# Patient Record
(demographics unavailable — no encounter records)

---

## 2017-02-25 NOTE — PCM.PREANE
Preanesthetic Assessment





- ANESTHESIA/TRANSFUSION/FAMILY HX


Anesthesia/Transfusion History: No Prior Transfusion(s), Prior Anesthesia (no 

problems)


Family History of Anesthesia Reaction: No





- REVIEW OF SYSTEMS


Constitutional: Reports: feeling ill


CNS: Reports: numbness (at times in hands)


Respiratory: Reports: no symptoms


Cardiovascular: Reports: no symptoms


GI: Reports: nausea, vomiting


Other: Reports: none





- PHYSICAL ASSESSMENT


HR: 120


O2 Sat by Pulse Oximetry: 99


RR: 22


BP: 149/87


Temp: 36.8 C


Vital Signs: 





 Last Vital Signs











Temp  36.8 C   02/25/17 17:50


 


Pulse  120 H  02/25/17 17:50


 


Resp  22 H  02/25/17 17:50


 


BP  149/87 H  02/25/17 17:50


 


Pulse Ox  99   02/25/17 17:50











Height: 1.75 m


Weight: 68.039 kg


NPO Status Date: 02/25/17


NPO Status Time: 00:00


ASA Class: 1E


Mental Status: alert & oriented x3


Airway Class: Mallampati = 1


Dentition: Reports: normal dentition


Thyro-Mental Finger Breadths: 3


Mouth Opening Finger Breadths: 2


ROM/Head Extension: full


Respiratory Status: lungs clear to auscultation bilaterally


Cardiovascular Status: regular rate & rhythm, normal S1, S2, no murmur, blood 

pressure WNL





- LAB


Values: 





 Laboratory Last Values











WBC  10.50 K/mm3 (4.23-9.07)  H  02/25/17  12:10    


 


RBC  5.78 M/mm3 (4.63-6.08)   02/25/17  12:10    


 


Hgb  17.2 gm/L (13.7-17.5)   02/25/17  12:10    


 


Hct  48.4 % (40.1-51.0)   02/25/17  12:10    


 


MCV  83.7 fl (79.0-92.2)   02/25/17  12:10    


 


MCH  29.8 pg (25.7-32.2)   02/25/17  12:10    


 


MCHC  35.5 g/dl (32.2-35.5)   02/25/17  12:10    


 


RDW Std Deviation  37.4 fL (35.1-43.9)   02/25/17  12:10    


 


Plt Count  207 K/mm3 (163-337)   02/25/17  12:10    


 


MPV  9.9 fl (9.4-12.3)   02/25/17  12:10    


 


Neutrophils % (Manual)  78 % (40-60)  H  02/25/17  12:10    


 


Band Neutrophils %  0 % (0-10)   02/25/17  12:10    


 


Lymphocytes % (Manual)  13 % (20-40)  L  02/25/17  12:10    


 


Atypical Lymphs %  0 %  02/25/17  12:10    


 


Monocytes % (Manual)  9 % (2-10)   02/25/17  12:10    


 


Eosinophils % (Manual)  0 % (0.8-7.0)  L  02/25/17  12:10    


 


Basophils % (Manual)  0  (0.2-1.2)  L  02/25/17  12:10    


 


Platelet Estimate  Adequate   02/25/17  12:10    


 


Plt Morphology Comment  Normal   02/25/17  12:10    


 


RBC Morph Comment  Normal   02/25/17  12:10    


 


Sodium  141 mEq/L (136-145)   02/25/17  12:10    


 


Potassium  3.9 mEq/L (3.5-5.1)   02/25/17  12:10    


 


Chloride  103 mEq/L ()   02/25/17  12:10    


 


Carbon Dioxide  29 mEq/L (21-32)   02/25/17  12:10    


 


Anion Gap  12.9  (5-15)   02/25/17  12:10    


 


BUN  12 mg/dL (7-18)   02/25/17  12:10    


 


Creatinine  1.0 mg/dL (0.7-1.3)   02/25/17  12:10    


 


Est Cr Clr Drug Dosing  TNP   02/25/17  12:10    


 


Estimated GFR (MDRD)  > 60 mL/min (>60)   02/25/17  12:10    


 


BUN/Creatinine Ratio  12.0  (14-18)  L  02/25/17  12:10    


 


Glucose  93 mg/dL ()   02/25/17  12:10    


 


Calcium  9.5 mg/dL (8.5-10.1)   02/25/17  12:10    


 


Total Bilirubin  1.8 mg/dL (0.2-1.0)  H  02/25/17  12:10    


 


AST  15 U/L (15-37)   02/25/17  12:10    


 


ALT  27 U/L (16-63)   02/25/17  12:10    


 


Alkaline Phosphatase  74 U/L ()   02/25/17  12:10    


 


Total Protein  7.6 g/dl (6.4-8.2)   02/25/17  12:10    


 


Albumin  4.4 g/dl (3.4-5.0)   02/25/17  12:10    


 


Globulin  3.2 gm/dL  02/25/17  12:10    


 


Albumin/Globulin Ratio  1.4  (1-2)   02/25/17  12:10    


 


Urine Color  Yellow  (Yellow)   02/25/17  13:55    


 


Urine Appearance  Clear  (Clear)   02/25/17  13:55    


 


Urine pH  6.0  (5.0-8.0)   02/25/17  13:55    


 


Ur Specific Gravity  1.025  (1.005-1.030)   02/25/17  13:55    


 


Urine Protein  Trace  (Negative)  H  02/25/17  13:55    


 


Urine Glucose (UA)  Negative  (Negative)   02/25/17  13:55    


 


Urine Ketones  1+  (Negative)  H  02/25/17  13:55    


 


Urine Occult Blood  Trace-intact  (Negative)  H  02/25/17  13:55    


 


Urine Nitrite  Negative  (Negative)   02/25/17  13:55    


 


Urine Bilirubin  Negative  (Negative)   02/25/17  13:55    


 


Urine Urobilinogen  0.2  (0.2-1.0)   02/25/17  13:55    


 


Ur Leukocyte Esterase  Negative  (Negative)   02/25/17  13:55    


 


Urine RBC  0-5 /hpf (0-5)   02/25/17  13:55    


 


Urine WBC  0-5 /hpf (0-5)   02/25/17  13:55    


 


Ur Epithelial Cells  0-5 /hpf (0-5)   02/25/17  13:55    


 


Urine Bacteria  Occasional /hpf (FEW)   02/25/17  13:55    


 


Urine Mucus  Moderate /hpf (FEW)  H  02/25/17  13:55    














- ALLERGIES


Allergies/Adverse Reactions: 


 Allergies











Allergy/AdvReac Type Severity Reaction Status Date / Time


 


No Known Allergies Allergy   Verified 08/02/16 18:50














- BLOOD


Blood Available: No


Product(s) Available: None





- ANESTHESIA PLAN


Preop Beta Blocker: No


Anesthesia Type Planned: general anesthesia





- ACKNOWLEDGEMENTS


Pt an appropriate candidate for the planned anesthesia: Yes


Alternatives and risks of anesthesia discussed w pt/guardian: Yes


Pt/Guardian understands and agree with anesthesia plan: Yes





PreAnesthesia Questionnaire





- Past Health History


Medical/Surgical History: Denies Medical/Surgical History





- SUBSTANCE USE


Smoking Status *Q: Never Smoker


Tobacco Use Within Last Twelve Months: No


Second Hand Smoke Exposure: No


Days Per Week of Alcohol Use: 0


Number of Drinks Per Day: 0


Total Drinks Per Week: 0


Recreational Drug Use History: No





- HOME MEDS


Home Medications: 


 Home Meds





. [No Known Home Meds]  08/02/16 [History]











- CURRENT (IN HOUSE) MEDS


Current Meds: 





 Current Medications





Fentanyl (Sublimaze)  50 mcg IVPUSH Q5M PRN


   PRN Reason: Pain


   Stop: 02/25/17 18:04


Hydromorphone HCl (Dilaudid)  0.5 mg IVPUSH Q15M PRN


   PRN Reason: Pain


   Stop: 02/25/17 18:04


Lactated Ringer's (Ringers, Lactated)  1,000 mls @ 150 mls/hr IV ASDIRECTED BRITTANY


   Last Admin: 02/25/17 12:31 Dose:  150 mls/hr


Scopolamine (Transderm-Scop)  1.5 mg TRDERM Q72H PRN


   PRN Reason: Nausea


Sodium Chloride (Saline Flush)  10 ml FLUSH ONETIME PRN


   PRN Reason: IV FLUSH


   Last Admin: 02/25/17 14:21 Dose:  10 ml





Discontinued Medications





Bupivacaine HCl/Epinephrine Bitart (Marcaine 0.5%/Epinephrine 1:200,000) 

Confirm Administered Dose 50 ml .ROUTE .STK-MED ONE


   Stop: 02/25/17 16:12


   Last Admin: 02/25/17 17:00 Dose:  50 ml


Dexamethasone (Dexamethasone) Confirm Administered Dose 20 mg .ROUTE .STK-MED 

ONE


   Stop: 02/25/17 16:53


Diatrizoate Meglum/Diatrizoate Sod (Gastrografin 37%)  90 ml PO ONETIME ONE


   Stop: 02/25/17 14:02


   Last Admin: 02/25/17 14:19 Dose:  90 ml


Diphenhydramine HCl (Benadryl) Confirm Administered Dose 50 mg .ROUTE .STK-MED 

ONE


   Stop: 02/25/17 16:53


Fentanyl (Sublimaze) Confirm Administered Dose 250 mcg .ROUTE .STK-MED ONE


   Stop: 02/25/17 16:17


Glycopyrrolate () Confirm Administered Dose 1 mg .ROUTE .STK-MED ONE


   Stop: 02/25/17 17:36


Hydromorphone HCl (Dilaudid)  0.5 mg IVPUSH ONETIME ONE


   Stop: 02/25/17 12:41


   Last Admin: 02/25/17 12:54 Dose:  0.5 mg


Hydromorphone HCl (Dilaudid)  0.5 mg IVPUSH ONETIME ONE


   Stop: 02/25/17 15:15


   Last Admin: 02/25/17 15:21 Dose:  0.5 mg


Ertapenem 1 gm/ Sodium (Chloride)  100 mls @ 100 mls/hr IV ONETIME ONE


   Stop: 02/25/17 16:42


   Last Admin: 02/25/17 15:52 Dose:  100 mls/hr


Lactated Ringer's (Ringers, Lactated) Confirm Administered Dose 1,000 mls @ as 

directed .ROUTE .STK-MED ONE


   Stop: 02/25/17 17:08


Iopamidol (Isovue-300 (61%))  125 ml IVPUSH ONETIME ONE


   Stop: 02/25/17 14:02


   Last Admin: 02/25/17 14:21 Dose:  125 ml


Lidocaine HCl (Lidocaine 1%) Confirm Administered Dose 4 ml .ROUTE .STK-MED ONE


   Stop: 02/25/17 16:17


Lidocaine/Epinephrine (Xylocaine 1% With Epinephrine 1:100,000) Confirm 

Administered Dose 20 ml .ROUTE .STK-MED ONE


   Stop: 02/25/17 16:12


   Last Admin: 02/25/17 17:01 Dose:  20 ml


Metoclopramide HCl (Reglan)  5 mg IVPUSH ONETIME ONE


   Stop: 02/25/17 15:15


   Last Admin: 02/25/17 15:20 Dose:  5 mg


Metoclopramide HCl (Reglan)  5 mg IVPUSH ONETIME ONE


   Stop: 02/25/17 16:07


   Last Admin: 02/25/17 16:11 Dose:  5 mg


Midazolam HCl (Versed 1 Mg/Ml) Confirm Administered Dose 2 mg .ROUTE .STK-MED 

ONE


   Stop: 02/25/17 16:17


Neostigmine Methylsulfate (Neostigmine) Confirm Administered Dose 5 mg .ROUTE 

.STK-MED ONE


   Stop: 02/25/17 17:36


Ondansetron HCl (Zofran)  4 mg IVPUSH ONETIME ONE


   Stop: 02/25/17 12:21


   Last Admin: 02/25/17 12:31 Dose:  4 mg


Ondansetron HCl (Zofran)  4 mg IVPUSH ONETIME ONE


   Stop: 02/25/17 12:41


   Last Admin: 02/25/17 12:53 Dose:  4 mg


Ondansetron HCl (Zofran) Confirm Administered Dose 4 mg .ROUTE .STK-MED ONE


   Stop: 02/25/17 16:16


Propofol (Diprivan  20 Ml) Confirm Administered Dose 200 mg .ROUTE .STK-MED ONE


   Stop: 02/25/17 16:17


Rocuronium Bromide (Zemuron) Confirm Administered Dose 50 mg .ROUTE .STK-MED ONE


   Stop: 02/25/17 16:16

## 2017-02-25 NOTE — EDM.PDOC
ED HPI GI/ABDOMINAL





- General


Chief Complaint: Abdominal Pain


Stated Complaint: LOWER ABD PAIN


Time Seen by Provider: 02/25/17 11:59





- History of Present Illness


INITIAL COMMENTS - FREE TEXT/NARRATIVE: 





23-year-old male presents emergency room with abdominal pain.





Patient complains of midabdominal discomfortthis started very early this 

morning over time this pain migrated over to the right lower quadrant and 

became a little bit more intense. The patient had an episode of lower abdominal 

discomfort this last summer and he responded favorably to constipation 

treatment with magnesium citrate. This is different his pain before was on the 

left this is now on the right. His father drove him in this morning and every 

bump in the Road aggravated his abdominal pain. Patient's past medical history 

is otherwise unremarkable.





- Related Data


Allergies/ADRs: 


 Allergies











Allergy/AdvReac Type Severity Reaction Status Date / Time


 


No Known Allergies Allergy   Verified 08/02/16 18:50











Home Meds: 


 Home Meds





. [No Known Home Meds]  08/02/16 [History]











Past Medical History





- Past Health History


Medical/Surgical History: Denies Medical/Surgical History





Social & Family History





- Tobacco Use


Smoking Status *Q: Never Smoker


Second Hand Smoke Exposure: No





- Recreational Drug Use


Recreational Drug Use: No





- Living Situation & Occupation


Occupation: employed





ED ROS GENERAL





- Review of Systems


Review Of Systems: See Below


Constitutional: Reports: no symptoms.  Denies: fever, chills


HEENT: Reports: No symptoms


Respiratory: Reports: no symptoms


Cardiovascular: Reports: No symptoms


GI/Abdominal: Reports: Abdominal pain, Nausea, Vomiting.  Denies: Constipation, 

Diarrhea


: Reports: no symptoms


Musculoskeletal: Reports: no symptoms





ED EXAM, GI/ABD





- Physical Exam


Exam: See Below


Exam Limited By: No limitations


General Appearance: alert, no apparent distress


Head: atraumatic, normocephalic


Respiratory/Chest: no respiratory distress, lungs clear, normal breath sounds


Cardiovascular: regular rate, rhythm, no edema, no murmur


GI/Abdominal: normal bowel sounds, soft.  No: non tender


 (Male) Exam: Other (and Wednesday but none he has significant right lower 

quadrant discomfort with mild rebound tenderness, is indeed present and 

reproducible. No rigidity.)


Back Exam: normal inspection.  No: CVA tenderness (L), CVA tenderness (R)





Course





- Vital Signs


Last Recorded V/S: 


 Last Vital Signs











Temp  36.9 C   02/25/17 11:53


 


Pulse  67   02/25/17 11:53


 


Resp  20   02/25/17 11:53


 


BP  128/89   02/25/17 11:53


 


Pulse Ox  99   02/25/17 11:53














- Orders/Labs/Meds


Orders: 


 Active Orders 24 hr











 Category Date Time Status


 


 Abdomen 2V AP Flat Upright [CR] Stat Exams  02/25/17 12:19 Taken


 


 Ertapenem [INVanz] 1 gm Med  02/25/17 15:43 Active





 Sodium Chloride 0.9% [Normal Saline] 100 ml   





 IV ONETIME   


 


 Lactated Ringers [Ringers, Lactated] 1,000 ml Med  02/25/17 12:30 Active





 IV ASDIRECTED   


 


 Sodium Chloride 0.9% [Saline Flush] Med  02/25/17 14:01 Active





 10 ml FLUSH ONETIME PRN   








 Medication Orders





Lactated Ringer's (Ringers, Lactated)  1,000 mls @ 150 mls/hr IV ASDIRECTED BRITTANY


   Last Admin: 02/25/17 12:31  Dose: 150 mls/hr


Ertapenem 1 gm/ Sodium (Chloride)  100 mls @ 100 mls/hr IV ONETIME ONE


   Stop: 02/25/17 16:42


   Last Admin: 02/25/17 15:52  Dose: 100 mls/hr


Sodium Chloride (Saline Flush)  10 ml FLUSH ONETIME PRN


   PRN Reason: IV FLUSH


   Last Admin: 02/25/17 14:21  Dose: 10 ml








Labs: 


 Laboratory Tests











  02/25/17 02/25/17 02/25/17 Range/Units





  12:10 12:10 13:55 


 


WBC  10.50 H    (4.23-9.07)  K/mm3


 


RBC  5.78    (4.63-6.08)  M/mm3


 


Hgb  17.2    (13.7-17.5)  gm/L


 


Hct  48.4    (40.1-51.0)  %


 


MCV  83.7    (79.0-92.2)  fl


 


MCH  29.8    (25.7-32.2)  pg


 


MCHC  35.5    (32.2-35.5)  g/dl


 


RDW Std Deviation  37.4    (35.1-43.9)  fL


 


Plt Count  207    (163-337)  K/mm3


 


MPV  9.9    (9.4-12.3)  fl


 


Neutrophils % (Manual)  78 H    (40-60)  %


 


Band Neutrophils %  0    (0-10)  %


 


Lymphocytes % (Manual)  13 L    (20-40)  %


 


Atypical Lymphs %  0    %


 


Monocytes % (Manual)  9    (2-10)  %


 


Eosinophils % (Manual)  0 L    (0.8-7.0)  %


 


Basophils % (Manual)  0 L    (0.2-1.2)  


 


Platelet Estimate  Adequate    


 


Plt Morphology Comment  Normal    


 


RBC Morph Comment  Normal    


 


Sodium   141   (136-145)  mEq/L


 


Potassium   3.9   (3.5-5.1)  mEq/L


 


Chloride   103   ()  mEq/L


 


Carbon Dioxide   29   (21-32)  mEq/L


 


Anion Gap   12.9   (5-15)  


 


BUN   12   (7-18)  mg/dL


 


Creatinine   1.0   (0.7-1.3)  mg/dL


 


Est Cr Clr Drug Dosing   TNP   


 


Estimated GFR (MDRD)   > 60   (>60)  mL/min


 


BUN/Creatinine Ratio   12.0 L   (14-18)  


 


Glucose   93   ()  mg/dL


 


Calcium   9.5   (8.5-10.1)  mg/dL


 


Total Bilirubin   1.8 H   (0.2-1.0)  mg/dL


 


AST   15   (15-37)  U/L


 


ALT   27   (16-63)  U/L


 


Alkaline Phosphatase   74   ()  U/L


 


Total Protein   7.6   (6.4-8.2)  g/dl


 


Albumin   4.4   (3.4-5.0)  g/dl


 


Globulin   3.2   gm/dL


 


Albumin/Globulin Ratio   1.4   (1-2)  


 


Urine Color    Yellow  (Yellow)  


 


Urine Appearance    Clear  (Clear)  


 


Urine pH    6.0  (5.0-8.0)  


 


Ur Specific Gravity    1.025  (1.005-1.030)  


 


Urine Protein    Trace H  (Negative)  


 


Urine Glucose (UA)    Negative  (Negative)  


 


Urine Ketones    1+ H  (Negative)  


 


Urine Occult Blood    Trace-intact H  (Negative)  


 


Urine Nitrite    Negative  (Negative)  


 


Urine Bilirubin    Negative  (Negative)  


 


Urine Urobilinogen    0.2  (0.2-1.0)  


 


Ur Leukocyte Esterase    Negative  (Negative)  


 


Urine RBC    0-5  (0-5)  /hpf


 


Urine WBC    0-5  (0-5)  /hpf


 


Ur Epithelial Cells    0-5  (0-5)  /hpf


 


Urine Bacteria    Occasional  (FEW)  /hpf


 


Urine Mucus    Moderate H  (FEW)  /hpf











Meds: 


Medications











Generic Name Dose Route Start Last Admin





  Trade Name Freq  PRN Reason Stop Dose Admin


 


Lactated Ringer's  1,000 mls @ 150 mls/hr  02/25/17 12:30  02/25/17 12:31





  Ringers, Lactated  IV   150 mls/hr





  ASDIRECTED BRITTANY   Administration


 


Ertapenem 1 gm/ Sodium  100 mls @ 100 mls/hr  02/25/17 15:43  02/25/17 15:52





  Chloride  IV  02/25/17 16:42  100 mls/hr





  ONETIME ONE   Administration


 


Sodium Chloride  10 ml  02/25/17 14:01  02/25/17 14:21





  Saline Flush  FLUSH   10 ml





  ONETIME PRN   Administration





  IV FLUSH   














Discontinued Medications














Generic Name Dose Route Start Last Admin





  Trade Name Freq  PRN Reason Stop Dose Admin


 


Diatrizoate Meglum/Diatrizoate Sod  90 ml  02/25/17 14:01  02/25/17 14:19





  Gastrografin 37%  PO  02/25/17 14:02  90 ml





  ONETIME ONE   Administration


 


Hydromorphone HCl  0.5 mg  02/25/17 12:40  02/25/17 12:54





  Dilaudid  IVPUSH  02/25/17 12:41  0.5 mg





  ONETIME ONE   Administration


 


Hydromorphone HCl  0.5 mg  02/25/17 15:14  02/25/17 15:21





  Dilaudid  IVPUSH  02/25/17 15:15  0.5 mg





  ONETIME ONE   Administration


 


Iopamidol  125 ml  02/25/17 14:01  02/25/17 14:21





  Isovue-300 (61%)  IVPUSH  02/25/17 14:02  125 ml





  ONETIME ONE   Administration


 


Metoclopramide HCl  5 mg  02/25/17 15:14  02/25/17 15:20





  Reglan  IVPUSH  02/25/17 15:15  5 mg





  ONETIME ONE   Administration


 


Ondansetron HCl  4 mg  02/25/17 12:20  02/25/17 12:31





  Zofran  IVPUSH  02/25/17 12:21  4 mg





  ONETIME ONE   Administration


 


Ondansetron HCl  4 mg  02/25/17 12:40  02/25/17 12:53





  Zofran  IVPUSH  02/25/17 12:41  4 mg





  ONETIME ONE   Administration














- Re-Assessments/Exams


Free Text/Narrative Re-Assessment/Exam: 





02/25/17 15:08


labs nonspecific. Follow through with a abdominal pelvic CT which could be 

consistent with early appendicitis but is not clearly definitive. Case 

discussed with Dr. Wheeler who will evaluate the patient.





Departure





- Departure


Time of Disposition: 15:09


Disposition: DC/Tfer to [facility]


Clinical Impression: 


 Appendicitis








- My Orders


Last 24 Hours: 


My Active Orders





02/25/17 12:19


Abdomen 2V AP Flat Upright [CR] Stat 





02/25/17 12:30


Lactated Ringers [Ringers, Lactated] 1,000 ml IV ASDIRECTED 





02/25/17 14:01


Sodium Chloride 0.9% [Saline Flush]   10 ml FLUSH ONETIME PRN 














- Assessment/Plan


Last 24 Hours: 


My Active Orders





02/25/17 12:19


Abdomen 2V AP Flat Upright [CR] Stat 





02/25/17 12:30


Lactated Ringers [Ringers, Lactated] 1,000 ml IV ASDIRECTED 





02/25/17 14:01


Sodium Chloride 0.9% [Saline Flush]   10 ml FLUSH ONETIME PRN

## 2017-02-25 NOTE — CT
CT abdomen and pelvis

 

Technique: Multiple axial sections were obtained from above the dome 

of the diaphragm inferiorly through the pubic symphysis.  Delayed 

images were also obtained through the bladder.

 

Comparison: No previous abdominal CT, previous abdominal x-ray is 

available performed on the same day.

 

Findings: Visualized lung bases shows nothing acute.  Liver shows no 

focal parenchymal abnormality.  Spleen appears within normal limits.  

Adrenal glands show no nodule.  Pancreas is with within normal limits.

  Gallbladder shows no calcified gallstones.  Kidneys show symmetric 

contrast enhancement.  Small low-density finding is seen within the 

mid to lower left kidney measuring about 4 mm which is too small to 

characterize by Hounsfield unit measurements but most likely 

represents a minimal cyst.

 

Aorta shows no aneurysmal dilatation.  No retroperitoneal adenopathy 

is seen.  No mesenteric abnormalities are appreciated.  No pelvic mass

 or adenopathy is seen.  Delayed images shows contrast within the 

distal ureters and within the bladder.

 

Short tubular structure containing fluid is seen within the right 

lower quadrant.  Finding could represent a short but slightly enlarged

 appendix.  Difficult to exclude early appendicitis, please correlate 

with patient's symptoms as this is not a definite finding.

 

Bone window settings were reviewed which shows unilateral 

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

 

Impression:

1.  Short tubular structure within the right lower abdomen containing 

fluid.  Findings could represent a small but slightly dilated 

appendix.  Difficult to exclude early appendicitis although this is 

not a definite finding.  Please correlate with the patient's symptoms.

2.  Other incidental findings as noted above

 

Diagnostic code #3

## 2017-02-25 NOTE — PCM.OPNOTE
- General Post-Op/Procedure Note


Date of Surgery/Procedure: 02/25/17


Operative Procedure(s): Laparoscopic appendectomy


Pre Op Diagnosis: Abdominal pain consistent with acute appendicitis


Post-Op Diagnosis: Retrocecal appendix with non-perforated appendicitis


Anesthesia Technique: General ET tube, Local


Primary Surgeon: Zoraida Wheeler


Anesthesia Provider: Alexis Hadley


Pathology: 





Appendix


Fluid Replacement, Intraop: 1,000 (mL crystalloid )


EBL in mLs: 3


Complications: None


Condition: Good


Free Text/Narrative:: 





INDICATION FOR PROCEDURE:


The patient is a 23-year-old man who had been referred to me by Dr. Wilder Deleon from the Emergency Department for evaluation for acute appendicitis.  

I discussed with the patient performing a laparoscopic appendectomy and 

associated risks of the procedure.  The patient found these risks acceptable 

and agreed to proceed. 





DESCRIPTION OF PROCEDURE:


The patient was taken to the operating room and placed in the supine position.  

Sequential compressive devices were placed on the bilateral lower extremities.  

After induction of general endotracheal anesthesia, the abdomen was prepped and 

draped in the usual sterile fashion. The left arm had been tucked at the patient

's side and pressure points adequately padded. Treatment antibiotics in the 

form of Invanz had been administered as per protocol. 





A curvilinear supraumbilical incision was made using a scalpel.  The abdomen 

was bluntly entered using a hemostat.  An 0 Vicryl stay suture was placed.  A 

Mirza cannula was introduced into the abdomen and the abdomen was insufflated 

to 15 mm of mercury.  The abdomen was then surveyed.  There was a small amount 

of turbid fluid. The liver, stomach, gallbladder, and other visualized portions 

of the intra-abdominal organs appeared unremarkable. 





Two additional 5 mm trocars were then placed under direct visualization after 

first injecting local anesthetic, one in the suprapubic region and one in the 

left lower quadrant. The appendix was densely adherent to the terminal ileum 

and was retrocecal. After appropriate moblization, the appendix was then 

dissected at the base of the cecum. A mesenteric window was created using a 

[place]. The appendiceal base was then taken at the cecum using a white load 

Endo JAIRO stapler x 2.  The appendiceal mesentery was very carefully taken using 

the Harmonic scalpel, dissecting the appendix slowly away from the terminal 

ileum. The appendix was placed into a Endo Catch bag. The abdomen was suctioned 

and irrigated. There was some slight ooze from the staple line, two 5 mm clips 

were applied and piece of Surgicel was placed. Hemostasis was confirmed. The 5 

mm trocars were removed with no evidence of bleeding. The Mirza cannula and 

appendix within the EndoCatch bag were then removed.  





The patient's fascial incision was closed using an 0 Vicryl figure-of-eight 

suture. Additional local anesthetic was injected astrid-incisionally.  The 

incisions were then closed using subcuticular 4-0 Monocryl suture.  Dermabond 

was placed over the patient's skin incisions.  





The patient was then awakened from anesthesia, extubated, and transferred to 

the recovery room in stable condition having tolerated the procedure well. 

Sponge and instrument counts were reported as correct at the end of the case.





POSTOPERATIVE PLAN: 


I discussed my intraoperative findings and post-operative care instructions 

with the patient's parents. The patient will be discharged home today. 

Prescriptions for Percocet 5/325mg and Zofran ODT were given via [facility]. A 

Scopolamine patch was also applied and is to be removed in 3 days. The patient 

will follow up with me in 2 weeks for a post-operative check. They are not lift 

over 20 pounds for the next 4 weeks. They are to call the office with any 

questions or concerns.

## 2017-02-25 NOTE — PCM.CONS
H&P History of Present Illness





- General


Date of Service: 02/25/17


Admit Problem/Dx: 


 Admission Diagnosis/Problem





Admission Diagnosis/Problem      Appendicitis











- History of Present Illness


Initial Comments - Free Text/Narative: 





The patient is a 23-year-old man who presents with complaints of abdominal pain 

for the past approximately 18 hours. They were referred to me by Dr. Wilder Deleon in the emergency department. The pain began at 6 PM yesterday, around 

the umbilicus and is progressively moved to the right lower quadrant. They have 

never had pain like this before, except for one time last year when he had left 

lower quadrant pain when he was constipated.. They have had nausea, vomiting, 

fever and chills, including emesis x2 in the emergency department.  Denies 

hematochezia, melena, diarrhea or constipation. Last bowel movement was 

yesterday and the patient did not look at it. They deny any personal or family 

history of inflammatory bowel disease.  No history of colon cancer in first-

degree relatives.  No previous abdominal operations. White blood cell count was 

fairly unremarkable at 10,000. CT scan of the Abdomen and Pelvis was performed 

demonstrating equivocal appendicitis per the radiologist.  I did not appreciate 

any obvious signs of appendicitis on the CT scan. No prior colonoscopy.  Here 

today with mom and dad. 


  ** Middle Abdomen


Pain Score (Numeric/FACES): 5





- Related Data


Allergies/Adverse Reactions: 


 Allergies











Allergy/AdvReac Type Severity Reaction Status Date / Time


 


No Known Allergies Allergy   Verified 08/02/16 18:50











Home Medications: 


 Home Meds





. [No Known Home Meds]  08/02/16 [History]











Past Medical History





- Past Health History


Medical/Surgical History: Denies Medical/Surgical History





Social & Family History





- Family History


Family Medical History: Noncontributory


Other Family History: 








No family hx of inflammatory bowel disease or colon cancer in first-degree 

relatives. No family hx of bleeding/clotting disorders.  Mom has a history of 

severe postoperative nausea and vomiting. 








- Tobacco Use


Smoking Status *Q: Never Smoker


Second Hand Smoke Exposure: No





- Caffeine Use


Caffeine Use: Reports: Coffee, Soda





- Alcohol Use


Alcohol Use History: No





- Recreational Drug Use


Recreational Drug Use: No





- Living Situation & Occupation


Living situation: Reports: single, alone


Occupation: employed


Social History Comment: Patient works at [employer].





H&P Review of Systems





- Review of Systems:


Review Of Systems: ROS reveals no pertinent complaints other than HPI.


General: Reports: fever, chills


HEENT: Reports: no symptoms


Pulmonary: Reports: no symptoms


Cardiovascular: Reports: no symptoms.  Denies: chest pain, palpitations, 

dyspnea on exertion


Gastrointestinal: Reports: Abdominal pain, Nausea, Vomiting.  Denies: Bloody 

stool, Constipation, Diarrhea


Musculoskeletal: Reports: no symptoms


Skin: Reports: no symptoms


Psychiatric: Reports: no symptoms


Neurological: Reports: no symptoms


Hematologic/Lymphatic: Reports: no symptoms


Immunologic: Reports: no symptoms





Exam





- Exam


Exam: See Below





- Vital Signs


Vital Signs: 


 Last Vital Signs











Temp  98.4 F   02/25/17 11:53


 


Pulse  67   02/25/17 11:53


 


Resp  20   02/25/17 11:53


 


BP  128/89   02/25/17 11:53


 


Pulse Ox  99   02/25/17 11:53











Weight: 150 lb





- Exam


General: alert, oriented, cooperative, mild distress


HEENT: Conjunctiva clear, Hearing intact.  No: Scleral icterus


Neck: supple, trachea midline, 2


Lungs: Clear to auscultation, Normal respiratory effort


Cardiovascular: regular rate, regular rhythm


Abdomen: soft, tenderness (RLQ).  No: peritoneal signs, distention, guarding, 

rigidity, rebound, mass


Rectal (Males) Exam: Deferred


Extremities: normal inspection.  No: clubbing, cyanosis, calf tenderness, edema


Skin: warm, dry, intact


Neurological: normal speech.  No: focal deficit


Neuro Extensive - Mental Status: alert, oriented x3, normal mood/affect, normal 

cognition


Psychiatric: alert, normal affect, normal mood





- Patient Data


Lab Results last 24 hrs: 


 Laboratory Results - last 24 hr











  02/25/17 02/25/17 02/25/17 Range/Units





  12:10 12:10 13:55 


 


WBC  10.50 H    (4.23-9.07)  K/mm3


 


RBC  5.78    (4.63-6.08)  M/mm3


 


Hgb  17.2    (13.7-17.5)  gm/L


 


Hct  48.4    (40.1-51.0)  %


 


MCV  83.7    (79.0-92.2)  fl


 


MCH  29.8    (25.7-32.2)  pg


 


MCHC  35.5    (32.2-35.5)  g/dl


 


RDW Std Deviation  37.4    (35.1-43.9)  fL


 


Plt Count  207    (163-337)  K/mm3


 


MPV  9.9    (9.4-12.3)  fl


 


Neutrophils % (Manual)  78 H    (40-60)  %


 


Band Neutrophils %  0    (0-10)  %


 


Lymphocytes % (Manual)  13 L    (20-40)  %


 


Atypical Lymphs %  0    %


 


Monocytes % (Manual)  9    (2-10)  %


 


Eosinophils % (Manual)  0 L    (0.8-7.0)  %


 


Basophils % (Manual)  0 L    (0.2-1.2)  


 


Platelet Estimate  Adequate    


 


Plt Morphology Comment  Normal    


 


RBC Morph Comment  Normal    


 


Sodium   141   (136-145)  mEq/L


 


Potassium   3.9   (3.5-5.1)  mEq/L


 


Chloride   103   ()  mEq/L


 


Carbon Dioxide   29   (21-32)  mEq/L


 


Anion Gap   12.9   (5-15)  


 


BUN   12   (7-18)  mg/dL


 


Creatinine   1.0   (0.7-1.3)  mg/dL


 


Est Cr Clr Drug Dosing   TNP   


 


Estimated GFR (MDRD)   > 60   (>60)  mL/min


 


BUN/Creatinine Ratio   12.0 L   (14-18)  


 


Glucose   93   ()  mg/dL


 


Calcium   9.5   (8.5-10.1)  mg/dL


 


Total Bilirubin   1.8 H   (0.2-1.0)  mg/dL


 


AST   15   (15-37)  U/L


 


ALT   27   (16-63)  U/L


 


Alkaline Phosphatase   74   ()  U/L


 


Total Protein   7.6   (6.4-8.2)  g/dl


 


Albumin   4.4   (3.4-5.0)  g/dl


 


Globulin   3.2   gm/dL


 


Albumin/Globulin Ratio   1.4   (1-2)  


 


Urine Color    Yellow  (Yellow)  


 


Urine Appearance    Clear  (Clear)  


 


Urine pH    6.0  (5.0-8.0)  


 


Ur Specific Gravity    1.025  (1.005-1.030)  


 


Urine Protein    Trace H  (Negative)  


 


Urine Glucose (UA)    Negative  (Negative)  


 


Urine Ketones    1+ H  (Negative)  


 


Urine Occult Blood    Trace-intact H  (Negative)  


 


Urine Nitrite    Negative  (Negative)  


 


Urine Bilirubin    Negative  (Negative)  


 


Urine Urobilinogen    0.2  (0.2-1.0)  


 


Ur Leukocyte Esterase    Negative  (Negative)  


 


Urine RBC    0-5  (0-5)  /hpf


 


Urine WBC    0-5  (0-5)  /hpf


 


Ur Epithelial Cells    0-5  (0-5)  /hpf


 


Urine Bacteria    Occasional  (FEW)  /hpf


 


Urine Mucus    Moderate H  (FEW)  /hpf











Result Diagrams: 


 02/25/17 12:10





 02/25/17 12:10


Imaging Impressions last 24 hrs: 





CT images personally reviewed, please see HPI. 





Consult PN Assessment/Plan


Procedures: 


Procedures





C-REACTIVE PROTEIN (08/02/16)


COMPLETE CBC W/AUTO DIFF WBC (08/02/16)


COMPREHEN METABOLIC PANEL (08/02/16)


EMERGENCY DEPT VISIT (08/02/16)


HYDRATE IV INFUSION ADD-ON (08/02/16)


ROUTINE VENIPUNCTURE (08/02/16)


THER/PROPH/DIAG INJ IV PUSH (08/02/16)


TX/PRO/DX INJ NEW DRUG ADDON (08/02/16)


URINALYSIS AUTO W/SCOPE (08/02/16)


X-RAY EXAM OF ABDOMEN (08/02/16)








(1) Nausea & vomiting


SNOMED Code(s): 05149190


   Code(s): R11.2 - NAUSEA WITH VOMITING, UNSPECIFIED   Current Visit: Yes   





(2) Appendicitis


SNOMED Code(s): 15046576


   Code(s): K37 - UNSPECIFIED APPENDICITIS   Current Visit: Yes   





(3) Abdominal pain


SNOMED Code(s): 21672812


   Code(s): R10.9 - UNSPECIFIED ABDOMINAL PAIN   Current Visit: No   


Qualifiers: 


   Abdominal location: right lower quadrant   Qualified Code(s): R10.31 - Right 

lower quadrant pain   


Problem List Initiated/Reviewed/Updated: Yes


My Orders last 24 hours: 


My Active Orders





02/25/17 15:43


Ertapenem [INVanz] 1 gm   Sodium Chloride 0.9% [Normal Saline] 100 ml IV 

ONETIME 











Plan: 








23-year-old with acute appendicitis 





I discussed with the patient and his parents that his CT scan was quite 

unremarkable.  We discussed the has a small , nonmobile kidney stone on the 

left.  We also discussed that his appendix did not appear significantly 

diseased at this point, however his story is classic for appendicitis.  He 

would like to proceed with appendectomy, which I do not think is unreasonable.  

We discussed proceeding with laparoscopic, possible open, appendectomy. Risks 

of the procedure were discussed including pain, bleeding, infection, scarring, 

need for additional procedures, risks of anesthesia. The patient found these 

risks acceptable and agreed to proceed. Invanz 1 gm was started in the 

Emergency Department.

## 2017-02-25 NOTE — PCM.POSTAN
POST ANESTHESIA ASSESSMENT





- MENTAL STATUS


Mental Status: alert, oriented





- VITAL SIGNS


Pulse Rate: 120


SaO2: 99


Resp Rate: 22


Blood Pressure: 149/87


Temperature: 36.8 C





- RESPIRATORY


Respiratory Status: respiratory rate WNL, airway patent, O2 saturation stable





- CARDIOVASCULAR


CV Status: pulse rate WNL, blood pressure stable





- GASTROINTESTINAL


GI Status: no symptoms





- PAIN


Pain Score: 0





- POST OP HYDRATION


Hydration Status: adequate & stable





- OBSERVATIONS


Free Text/Narrative:: 





no anesthesia complications noted

## 2017-02-27 NOTE — CR
Abdomen: Supine and upright views of the abdomen were obtained.

 

Comparison: Previous abdominal x-ray of 08/02/16.

 

Bowel gas pattern appears normal.  No free air is seen.  Bony 

structures are unremarkable.  No abnormal calcifications are seen.  

Calcifications are noted within the pelvis which are felt compatible 

with phleboliths.

 

Impression:

1.  Nothing acute is appreciated on two-view abdominal x-ray.  No 

significant interval change is seen from previous exam.

 

Diagnostic code #2